# Patient Record
Sex: FEMALE | URBAN - METROPOLITAN AREA
[De-identification: names, ages, dates, MRNs, and addresses within clinical notes are randomized per-mention and may not be internally consistent; named-entity substitution may affect disease eponyms.]

---

## 2023-06-12 ENCOUNTER — AMB VIDEO VISIT (OUTPATIENT)
Dept: OTHER | Facility: HOSPITAL | Age: 48
End: 2023-06-12

## 2023-06-12 DIAGNOSIS — B96.89 ACUTE BACTERIAL SINUSITIS: Primary | ICD-10-CM

## 2023-06-12 DIAGNOSIS — J01.90 ACUTE BACTERIAL SINUSITIS: Primary | ICD-10-CM

## 2023-06-12 PROCEDURE — ECARE PR SL URGENT CARE VIRTUAL VISIT: Performed by: PHYSICIAN ASSISTANT

## 2023-06-12 RX ORDER — AMOXICILLIN AND CLAVULANATE POTASSIUM 875; 125 MG/1; MG/1
1 TABLET, FILM COATED ORAL EVERY 12 HOURS SCHEDULED
Qty: 14 TABLET | Refills: 0 | Status: SHIPPED | OUTPATIENT
Start: 2023-06-12 | End: 2023-06-19

## 2023-06-12 NOTE — PROGRESS NOTES
Video Visit - Bryant Matthew 46 y o  female MRN: [id number]    REQUIRED DOCUMENTATION:     At address in chart    1  [facility]  2  Provider located at [address] [place] [id number] [phone]  3  [facility] provider: Janet Roblero PA-C   4  Identify all parties in room with patient during [facility] visit:  Pt w/ spouse  5  After connecting through [email or web address], patient was identified by name and date of birth  Patient was then informed that this was a Telemedicine visit and that the exam was being conducted confidentially over secure lines  My office door was closed  No one else was in the room  Patient acknowledged consent and understanding of privacy and security of the Telemedicine visit  I informed the patient that I have reviewed their record in Epic and presented the opportunity for them to ask any questions regarding the visit today  The patient agreed to participate  Patient w/ PMH of T2DM managed with diet & herbs presents w/ spouse for c/o flu symptoms x 2 weeks  She denies fever, chills, sweats, chest tightness, dyspnea, SOB, abdominal pain, and diarrhea  She reports taking advil  She reports sinus congestion, throat pain, cough, and fatigue  She reports taking sudafed and robitussin w/ minimal improvement  Pt notes severe headache from the congestion  She notes vomiting twice yesterday and today due to nausea from PND  NKDA  Review of Systems   Constitutional: Positive for fatigue  Negative for chills and fever  HENT: Positive for congestion, postnasal drip, sinus pressure, sinus pain and sore throat  Negative for ear pain  Eyes: Negative for pain and visual disturbance  Respiratory: Positive for cough  Negative for chest tightness and shortness of breath  Cardiovascular: Negative for chest pain and palpitations  Gastrointestinal: Positive for nausea and vomiting  Negative for abdominal pain     Genitourinary: Negative for dysuria and hematuria  Musculoskeletal: Negative for arthralgias and back pain  Skin: Negative for color change and rash  Neurological: Positive for headaches  Negative for seizures and syncope  All other systems reviewed and are negative  Physical Exam  Vitals and nursing note reviewed  Exam conducted with a chaperone present  Constitutional:       General: She is not in acute distress  Appearance: Normal appearance  She is well-developed  She is ill-appearing  She is not diaphoretic  HENT:      Head: Normocephalic and atraumatic  Nose: Congestion present  Mouth/Throat:      Mouth: Mucous membranes are moist       Pharynx: Oropharynx is clear  Posterior oropharyngeal erythema present  No oropharyngeal exudate  Eyes:      General:         Right eye: No discharge  Left eye: No discharge  Conjunctiva/sclera: Conjunctivae normal    Pulmonary:      Effort: Pulmonary effort is normal  No respiratory distress  Breath sounds: Normal breath sounds  Musculoskeletal:      Cervical back: Neck supple  Skin:     General: Skin is dry  Findings: No rash  Comments: Of face and neck   Neurological:      Mental Status: She is alert and oriented to person, place, and time  Psychiatric:         Behavior: Behavior normal          Thought Content: Thought content normal        Diagnoses and all orders for this visit:    Acute bacterial sinusitis  -     amoxicillin-clavulanate (AUGMENTIN) 875-125 mg per tablet; Take 1 tablet by mouth every 12 (twelve) hours for 7 days      Patient Instructions     Take antibiotic as directed with food  Recommend probiotics for side effects  Continue with over-the-counter symptom relief  Monitor for worsening symptoms  If no improvement or worsening in 2-3 days, have an in-person evaluation    Rhinosinusitis   AMBULATORY CARE:   Rhinosinusitis (RS)  is inflammation or infection of your nasal passages and sinuses   RS is most often caused by a virus but may be caused by bacteria  Acute RS lasts up to 12 weeks  Chronic RS lasts more than 12 weeks  Recurrent RS means you have 4 or more infections in 1 year  Common signs and symptoms:   • Fever    • Pain, pressure, redness, or swelling around the forehead, cheeks, or eyes    • Thick yellow or green discharge from your nose    • Loss of smell or taste    • Dry cough that happens mostly at night or when you lie down    • Headache and face pain that is worse when you lean forward    • Tooth pain, or pain when you chew    Seek care immediately if:   • You have trouble breathing, or wheezing that is getting worse  • You have a stiff neck, a fever, or a bad headache  • You cannot open your eye  • Your eyeball bulges out, or you cannot move your eye  • You are more sleepy than usual, or you notice changes in your ability to think, move, or talk  • You have swelling of your forehead or scalp  Call your doctor if:   • Your symptoms are worse or do not improve after 3 to 5 days of treatment  • You have questions or concerns about your condition or care  Treatment  may not be needed  Your symptoms may go away on their own  Your healthcare provider may recommend watchful waiting for up to 10 days before starting antibiotics  Antibiotics will not help if the infection is caused by a virus  Check with your provider before you take any over-the-counter medicines  You may need any of the following:  • Acetaminophen  decreases pain and fever  It is available without a doctor's order  Ask how much to take and how often to take it  Follow directions  Read the labels of all other medicines you are using to see if they also contain acetaminophen, or ask your doctor or pharmacist  Acetaminophen can cause liver damage if not taken correctly  • NSAIDs , such as ibuprofen, help decrease swelling, pain, and fever  This medicine is available with or without a doctor's order   NSAIDs can cause stomach bleeding or kidney problems in certain people  If you take blood thinner medicine, always ask your healthcare provider if NSAIDs are safe for you  Always read the medicine label and follow directions  • Nasal steroid sprays  help decrease inflammation in your nose and sinuses  • Decongestants  help reduce swelling and drain mucus in the nose and sinuses  They may help you breathe easier  Do not use decongestants for more than 3 days  • Antihistamines  help dry mucus in the nose and relieve sneezing  • Antibiotics  help treat or prevent a bacterial infection  Self-care:   • Rinse your sinuses as directed  Use a sinus rinse device to rinse your nasal passages with a saline (salt water) solution or distilled water  Do not  use tap water  A sinus rinse will help thin the mucus in your nose and rinse away pollen and dirt  It will also help lower swelling so you can breathe normally  • Use a humidifier  to increase air moisture in your home  Moist air may make it easier for you to breathe and help decrease your cough  • Sleep with your head raised  Place an extra pillow under your head before you go to sleep to help your sinuses drain  • Drink liquids as directed  Ask your healthcare provider how much liquid to drink each day and which liquids are best for you  Liquids will thin the mucus in your nose and help it drain  Do not have drinks that contain alcohol or caffeine  • Do not smoke, and avoid secondhand smoke  Nicotine and other chemicals in cigarettes and cigars can make your symptoms worse  Ask your healthcare provider for information if you currently smoke and need help to quit  E-cigarettes or smokeless tobacco still contain nicotine  Talk to your healthcare provider before you use these products  Prevent the spread of germs:   • Wash your hands often with soap and water  Wash your hands after you use the bathroom, change a child's diaper, or sneeze   Wash your hands before you prepare or eat food  • Stay away from people who are sick  Some germs spread easily and quickly through contact  Follow up with your doctor as directed: You may be referred to an ear, nose, and throat specialist  Write down your questions so you remember to ask them during your visits  © [employer] 2022 Information is for End User's use only and may not be sold, redistributed or otherwise used for commercial purposes  The above information is an  only  It is not intended as medical advice for individual conditions or treatments  Talk to your doctor, nurse or pharmacist before following any medical regimen to see if it is safe and effective for you  Follow up with PCP if not improved, if symptoms are worse, go to the ER

## 2023-06-12 NOTE — CARE ANYWHERE EVISITS
Visit Summary for DEBBIE   JOSECLYDE - Gender: Female - Date of Birth: [id number]  Date: [id number] - Duration: 12 minutes  Patient: Lucita Gray   MILAGROS  Provider: Polly Michael PA-C    Patient Contact Information  Address  [address]  [phone]    Visit Topics  Flu-Like Symptoms [Added By: Self - 2023-06-12]  Cold [Added By: Self - 2023-06-12]  Headache [Added By: Self - 2023-06-12]  Fever [Added By: Self - 2023-06-12]  Stomachache [Added By: Self - 2023-06-12]    Triage Questions   What is your current physical address in the event of a medical emergency? Answer []  Are you allergic to any medications? Answer []  Are you now or could you be pregnant? Answer []  Do you have any immune system compromise or chronic lung   disease? Answer []  Do you have any vulnerable family members in the home (infant, pregnant, cancer, elderly)? Answer []     Conversation Transcripts  [0A][0A] [Notification] You are connected with Polly Michael PA-C, Urgent Care Specialist [0A][Notification] Munir Houston is located in [place]  [0A][Notification] Munir Houston has shared health history  Marisol Escobar  [0A]    Diagnosis  Acute sinusitis, unspecified    Procedures  Value: [id number] Code: CPT-4 UNLISTED E&M SERVICE    Medications Prescribed    No prescriptions ordered    Electronically signed by: Yaritza Adorno(NPI [id number])

## 2023-06-12 NOTE — PATIENT INSTRUCTIONS
Take antibiotic as directed with food  Recommend probiotics for side effects  Continue with over-the-counter symptom relief  Monitor for worsening symptoms  If no improvement or worsening in 2-3 days, have an in-person evaluation    Rhinosinusitis   AMBULATORY CARE:   Rhinosinusitis (RS)  is inflammation or infection of your nasal passages and sinuses  RS is most often caused by a virus but may be caused by bacteria  Acute RS lasts up to 12 weeks  Chronic RS lasts more than 12 weeks  Recurrent RS means you have 4 or more infections in 1 year  Common signs and symptoms:   Fever    Pain, pressure, redness, or swelling around the forehead, cheeks, or eyes    Thick yellow or green discharge from your nose    Loss of smell or taste    Dry cough that happens mostly at night or when you lie down    Headache and face pain that is worse when you lean forward    Tooth pain, or pain when you chew    Seek care immediately if:   You have trouble breathing, or wheezing that is getting worse  You have a stiff neck, a fever, or a bad headache  You cannot open your eye  Your eyeball bulges out, or you cannot move your eye  You are more sleepy than usual, or you notice changes in your ability to think, move, or talk  You have swelling of your forehead or scalp  Call your doctor if:   Your symptoms are worse or do not improve after 3 to 5 days of treatment  You have questions or concerns about your condition or care  Treatment  may not be needed  Your symptoms may go away on their own  Your healthcare provider may recommend watchful waiting for up to 10 days before starting antibiotics  Antibiotics will not help if the infection is caused by a virus  Check with your provider before you take any over-the-counter medicines  You may need any of the following:  Acetaminophen  decreases pain and fever  It is available without a doctor's order  Ask how much to take and how often to take it  Follow directions  Read the labels of all other medicines you are using to see if they also contain acetaminophen, or ask your doctor or pharmacist  Acetaminophen can cause liver damage if not taken correctly  NSAIDs , such as ibuprofen, help decrease swelling, pain, and fever  This medicine is available with or without a doctor's order  NSAIDs can cause stomach bleeding or kidney problems in certain people  If you take blood thinner medicine, always ask your healthcare provider if NSAIDs are safe for you  Always read the medicine label and follow directions  Nasal steroid sprays  help decrease inflammation in your nose and sinuses  Decongestants  help reduce swelling and drain mucus in the nose and sinuses  They may help you breathe easier  Do not use decongestants for more than 3 days  Antihistamines  help dry mucus in the nose and relieve sneezing  Antibiotics  help treat or prevent a bacterial infection  Self-care:   Rinse your sinuses as directed  Use a sinus rinse device to rinse your nasal passages with a saline (salt water) solution or distilled water  Do not  use tap water  A sinus rinse will help thin the mucus in your nose and rinse away pollen and dirt  It will also help lower swelling so you can breathe normally  Use a humidifier  to increase air moisture in your home  Moist air may make it easier for you to breathe and help decrease your cough  Sleep with your head raised  Place an extra pillow under your head before you go to sleep to help your sinuses drain  Drink liquids as directed  Ask your healthcare provider how much liquid to drink each day and which liquids are best for you  Liquids will thin the mucus in your nose and help it drain  Do not have drinks that contain alcohol or caffeine  Do not smoke, and avoid secondhand smoke  Nicotine and other chemicals in cigarettes and cigars can make your symptoms worse   Ask your healthcare provider for information if you currently smoke and need help to quit  E-cigarettes or smokeless tobacco still contain nicotine  Talk to your healthcare provider before you use these products  Prevent the spread of germs:   Wash your hands often with soap and water  Wash your hands after you use the bathroom, change a child's diaper, or sneeze  Wash your hands before you prepare or eat food  Stay away from people who are sick  Some germs spread easily and quickly through contact  Follow up with your doctor as directed: You may be referred to an ear, nose, and throat specialist  Write down your questions so you remember to ask them during your visits  © Copyright [facility] 2022 Information is for End User's use only and may not be sold, redistributed or otherwise used for commercial purposes  The above information is an  only  It is not intended as medical advice for individual conditions or treatments  Talk to your doctor, nurse or pharmacist before following any medical regimen to see if it is safe and effective for you

## 2023-06-12 NOTE — LETTER
June 12, 2023     Hemant Molina    Patient: Hemant Molina   YOB: 1975   Date of Visit: 6/12/2023       To Whom This May Concern:    Hemant Molina was evaluated via Telemedicine on 6/12/2023  Please excuse her from work through 6/13/2023      Sincerely,        Yolanda Rock PA-C        CC: No Recipients

## 2024-06-27 ENCOUNTER — OFFICE VISIT (OUTPATIENT)
Age: 49
End: 2024-06-27
Payer: COMMERCIAL

## 2024-06-27 VITALS
SYSTOLIC BLOOD PRESSURE: 123 MMHG | HEART RATE: 85 BPM | BODY MASS INDEX: 21.35 KG/M2 | DIASTOLIC BLOOD PRESSURE: 86 MMHG | HEIGHT: 67 IN | WEIGHT: 136 LBS

## 2024-06-27 DIAGNOSIS — M99.01 SEGMENTAL DYSFUNCTION OF CERVICAL REGION: Primary | ICD-10-CM

## 2024-06-27 DIAGNOSIS — M54.59 MECHANICAL LOW BACK PAIN: ICD-10-CM

## 2024-06-27 DIAGNOSIS — M99.04 SEGMENTAL DYSFUNCTION OF SACRAL REGION: ICD-10-CM

## 2024-06-27 DIAGNOSIS — M99.03 SEGMENTAL DYSFUNCTION OF LUMBAR REGION: ICD-10-CM

## 2024-06-27 DIAGNOSIS — M54.2 NECK PAIN: ICD-10-CM

## 2024-06-27 DIAGNOSIS — M99.02 SEGMENTAL DYSFUNCTION OF THORACIC REGION: ICD-10-CM

## 2024-06-27 PROCEDURE — 99203 OFFICE O/P NEW LOW 30 MIN: CPT | Performed by: CHIROPRACTOR

## 2024-06-27 PROCEDURE — 98941 CHIROPRACT MANJ 3-4 REGIONS: CPT | Performed by: CHIROPRACTOR

## 2024-06-27 NOTE — PROGRESS NOTES
Diagnoses and all orders for this visit:    Segmental dysfunction of cervical region    Neck pain    Segmental dysfunction of thoracic region    Segmental dysfunction of lumbar region    Segmental dysfunction of sacral region    Mechanical low back pain      ASSESSMENT:  No red flags, radiculopathy or neurologic deficit appreciated clinically. Pt's symptoms and exam findings consistent with mechanical neck/ubp secondary to repetitive st/sp injury, exacerbated by postural/ergonomic stressors. Pt responded well to stretches and manual mobilization of the affected spinal and myofascial tissues with increased ROM; trial of conservative tx recommended consisting of stretching, ther-ex, graded mobilization/manipulation of the affected spinal/myofascial tissues, postural/ergonomic education and take home stretches/exercises. If symptoms fail to improve with short trial of conservative care, appropriate imaging and referral will be coordinated.  Spent greater than 30 min c pt discussing hx, pe, ddx, tx options and reviewing notes/imaging    PROCEDURE CODES: 62501, 18251    TREATMENT:  Fear avoidance behavior discussion, encouraged and reassured pt that natural course of condition is to improve over time with adherence to tx plan and home care strategies. Home care recommendations: avoid bed rest, walk (but avoid trails and uneven surfaces), gradual return to activity to tolerance (avoid anything that peripheralizes symptoms), ice 20 min on/off, watch for ice burn, call if symptoms peripheralize, worsen, or neurologic deficit progresses. Ther-ex: IASTM - discussed post procedure soreness and/or ecchymosis for up to 36 hrs, applied to affected mm hypertonicities; wall kenzie, axial retraction, upper trap stretch, lev scap stretch, SCM stretch, lat rows with t-band, lat pull down with t-band, abdominal bracing; greater than 15 min spent performing above mentioned ther-ex. Thoracic mobilization/manipulation: prone P-A mob,   cervical mobilization/manipulation: diversified supine graded mobilization, cervical traction, prone C/T jct HVLA    HPI:  Radha Dupree is a 49 y.o. female   Chief Complaint   Patient presents with   • Neck - Pain     Neck pain that radiates down both shoulders. Patient also is having jaw and ear pain.    • Back Pain     Mid back pain on left side intermittent sharp pain.   Lower lumbar pain that radiates down left side and left foot .        The patient is feeling more pain in the neck pain, she mentions that she fell down the concrete stairs 15 years ago. The patient also reports increase stress. More stress in the afternoon. The patient works at a [employer]. 4-5 hours a day. 12-14 hours per week. Cooks and doing dishes. Massage, lying down. Just started yoga again about a week ago. She likes to sit on couch with laptop and knows her posture is not always the best. The patient reports their diet is good with fresh food.    Back Pain  Pertinent negatives include no chest pain, fever, headaches, numbness or weakness.     History reviewed. No pertinent past medical history.   The following portions of the patient's history were reviewed and updated as appropriate: allergies, past family history, past medical history, past social history, past surgical history, and problem list.  Review of Systems   Constitutional:  Negative for activity change, fatigue, fever and unexpected weight change.   HENT:  Negative for ear pain, hearing loss, sinus pressure, sinus pain, sore throat and tinnitus.    Respiratory:  Negative for chest tightness, shortness of breath, wheezing and stridor.    Cardiovascular:  Negative for chest pain.   Genitourinary:  Negative for flank pain and frequency.   Musculoskeletal:  Positive for back pain and myalgias. Negative for joint swelling, neck pain and neck stiffness.   Skin:  Negative for color change and pallor.   Neurological:  Negative for dizziness, speech difficulty, weakness, numbness and  headaches.   Psychiatric/Behavioral:  Negative for agitation and sleep disturbance. The patient is not nervous/anxious.      Physical Exam  Constitutional:       General: She is not in acute distress.     Appearance: Normal appearance.   HENT:      Head: Normocephalic.      Mouth/Throat:      Mouth: Mucous membranes are moist.   Eyes:      Extraocular Movements: Extraocular movements intact.      Conjunctiva/sclera: Conjunctivae normal.      Pupils: Pupils are equal, round, and reactive to light.   Neck:      Vascular: No carotid bruit.   Pulmonary:      Effort: Pulmonary effort is normal.   Chest:      Chest wall: No tenderness.   Abdominal:      General: Abdomen is flat.      Palpations: Abdomen is soft.   Musculoskeletal:         General: Tenderness present. No swelling, deformity or signs of injury. Normal range of motion.        Arms:       Cervical back: Normal range of motion. Rigidity and tenderness present.      Right lower leg: No edema.      Left lower leg: No edema.        Legs:    Lymphadenopathy:      Cervical: No cervical adenopathy.   Skin:     General: Skin is warm.      Coloration: Skin is not jaundiced or pale.      Findings: No bruising or erythema.   Neurological:      Mental Status: She is alert and oriented to person, place, and time.      Cranial Nerves: No cranial nerve deficit.      Sensory: No sensory deficit.      Motor: No weakness.      Gait: Gait is intact.      Deep Tendon Reflexes: Reflexes are normal and symmetric.   Psychiatric:         Attention and Perception: Attention normal.         Mood and Affect: Mood and affect normal.         Speech: Speech normal.         Behavior: Behavior normal. Behavior is cooperative.         Thought Content: Thought content normal.         Cognition and Memory: Cognition normal.         Judgment: Judgment normal.       SOFT TISSUE ASSESSMENT: Hypertonicity and tenderness palpated C5-T6 erector spinae, upper traps, lev scap, SCM, scalene, rhomboid,  suboccipitals JOINT RECTRICTIONS: C5-T6, L3-S1, L SIJ  ORTHO: Diane unremarkable for centralization/peripheralization; max foraminal comp elicits local np R/L; shoulder depression elicits stiffness in R/L upper trap; brachial plexus tension test elicits neural tension in R/L UE; cervical distraction elicits relieves CC    Return in about 1 week (around 7/4/2024) for Recheck.

## 2024-09-17 ENCOUNTER — OFFICE VISIT (OUTPATIENT)
Dept: INTERNAL MEDICINE CLINIC | Age: 49
End: 2024-09-17
Payer: COMMERCIAL

## 2024-09-17 VITALS
OXYGEN SATURATION: 99 % | HEART RATE: 88 BPM | BODY MASS INDEX: 21.21 KG/M2 | WEIGHT: 132 LBS | DIASTOLIC BLOOD PRESSURE: 76 MMHG | SYSTOLIC BLOOD PRESSURE: 116 MMHG | HEIGHT: 66 IN | TEMPERATURE: 98.5 F

## 2024-09-17 DIAGNOSIS — Z78.9 VEGETARIAN: ICD-10-CM

## 2024-09-17 DIAGNOSIS — Z12.11 SCREENING FOR MALIGNANT NEOPLASM OF COLON: ICD-10-CM

## 2024-09-17 DIAGNOSIS — G89.29 CHRONIC LEFT-SIDED LOW BACK PAIN WITH LEFT-SIDED SCIATICA: Primary | ICD-10-CM

## 2024-09-17 DIAGNOSIS — Z90.5 SOLITARY KIDNEY, ACQUIRED: ICD-10-CM

## 2024-09-17 DIAGNOSIS — M70.62 TROCHANTERIC BURSITIS OF LEFT HIP: ICD-10-CM

## 2024-09-17 DIAGNOSIS — F43.21 GRIEF: ICD-10-CM

## 2024-09-17 DIAGNOSIS — Z13.1 SCREENING FOR DIABETES MELLITUS: ICD-10-CM

## 2024-09-17 DIAGNOSIS — Z11.59 ENCOUNTER FOR HEPATITIS C SCREENING TEST FOR LOW RISK PATIENT: ICD-10-CM

## 2024-09-17 DIAGNOSIS — Z13.220 SCREENING CHOLESTEROL LEVEL: ICD-10-CM

## 2024-09-17 DIAGNOSIS — M54.42 CHRONIC LEFT-SIDED LOW BACK PAIN WITH LEFT-SIDED SCIATICA: Primary | ICD-10-CM

## 2024-09-17 PROCEDURE — 99203 OFFICE O/P NEW LOW 30 MIN: CPT | Performed by: FAMILY MEDICINE

## 2024-09-17 NOTE — PROGRESS NOTES
Assessment/Plan:    1. Chronic left-sided low back pain with left-sided sciatica  -     XR spine lumbar minimum 4 views non injury; Future; Expected date: 09/17/2024  -     XR hip/pelv 2-3 vws left if performed; Future; Expected date: 09/17/2024  -     Comprehensive metabolic panel; Future  -     Ambulatory Referral to Physical Therapy; Future  2. Screening for malignant neoplasm of colon  -     Cologuard  3. Solitary kidney, acquired  4. Vegetarian  5. Screening for diabetes mellitus  -     Hemoglobin A1C; Future  6. Screening cholesterol level  -     Lipid Panel with Direct LDL reflex; Future  7. BMI 21.0-21.9, adult  -     CBC and differential; Future  -     TSH, 3rd generation with Free T4 reflex; Future  8. Grief  9. Trochanteric bursitis of left hip  -     Ambulatory Referral to Physical Therapy; Future  10. Encounter for hepatitis C screening test for low risk patient  -     Hepatitis C antibody; Future          There are no Patient Instructions on file for this visit.    Return in about 4 weeks (around 10/15/2024).    Subjective:      Patient ID: Prema Craig is a 49 y.o. female.    Chief Complaint   Patient presents with    [facility]     Establish care- patient is new patient     Pain     Patient states hip pain, all down the left side- -  passed away 4 weeks ago - he was a message therapist working on her    HM     Patient refuses mammogram - Patient will try cologuard       HPI  Patient with several week history of left-sided musculoskeletal complaints, her  was a massage therapist who passed away 4 weeks ago, due to lack of therapy and the stress her symptoms have significantly worsened.  Patient has never had x-rays or formal physical therapy.  She has had acupuncture many years ago during her pregnancy.  Patient states she does not due to heavy lifting and did not need to do lifting while she was taking care of him.  Patient is not using any over-the-counter medications or topical  "solutions to help with the discomfort        The following portions of the patient's history were reviewed and updated as appropriate: allergies, current medications, past family history, past medical history, past social history, past surgical history and problem list.    Review of Systems      Constitutional:  Denies fever or chills   Eyes:  Denies double , blurry vision or eye pain  HENT:  Denies nasal congestion, sore throat or new hearing issues  Respiratory:  Denies cough or shortness of breath or wheezing  Cardiovascular:  Denies palpitations or chest pain  GI:  Denies abdominal pain, nausea, or vomiting, no loose stools, no reflux  Integument:  Denies rash , no open areas  Neurologic:  Denies headache or focal weakness, no dizziness  : no dysuria, or hematuria        No current outpatient medications on file.     No current facility-administered medications for this visit.       Objective:    /76 (BP Location: Left arm, Patient Position: Sitting, Cuff Size: Standard)   Pulse 88   Temp 98.5 °F (36.9 °C) (Temporal)   Ht 5' 6\" (1.676 m) Comment: shoes on  Wt 59.9 kg (132 lb)   SpO2 99%   BMI 21.31 kg/m²        Physical Exam      Constitutional:  Well developed, well nourished, no acute distress, non-toxic appearance   Eyes:  PERRL, conjunctiva normal , non icteric sclera  HENT:  Atraumatic, oropharynx moist. Neck-  supple   Respiratory:  CTA b/l, normal breath sounds, no rales, no wheezing   Cardiovascular:  RRR, no murmurs, no LE edema b/l  GI:  Soft, nondistended, normal bowel sounds x 4, nontender, no organomegaly, no mass, no rebound, no guarding   Neurologic:  no focal deficits noted   Psychiatric:  Speech and behavior appropriate , AAO x 3  MS: Flat lordotic curve, slight kyphosis, left trapezius with hypertonicity.  Tender to palpation left greater trochanter, no spinal pain to palpation, negative straight leg test bilaterally, tender to palpation left hamstring.  No knee pain to " palpation.       Yocasta Cordero DO

## 2024-10-17 PROBLEM — Z12.11 SCREENING FOR MALIGNANT NEOPLASM OF COLON: Status: RESOLVED | Noted: 2024-09-17 | Resolved: 2024-10-17

## 2024-10-17 PROBLEM — Z13.1 SCREENING FOR DIABETES MELLITUS: Status: RESOLVED | Noted: 2024-09-17 | Resolved: 2024-10-17

## 2024-10-23 ENCOUNTER — EVALUATION (OUTPATIENT)
Dept: PHYSICAL THERAPY | Facility: REHABILITATION | Age: 49
End: 2024-10-23
Payer: COMMERCIAL

## 2024-10-23 DIAGNOSIS — M70.62 TROCHANTERIC BURSITIS OF LEFT HIP: ICD-10-CM

## 2024-10-23 DIAGNOSIS — G89.29 CHRONIC LEFT-SIDED LOW BACK PAIN WITH LEFT-SIDED SCIATICA: Primary | ICD-10-CM

## 2024-10-23 DIAGNOSIS — M54.42 CHRONIC LEFT-SIDED LOW BACK PAIN WITH LEFT-SIDED SCIATICA: Primary | ICD-10-CM

## 2024-10-23 PROCEDURE — 97162 PT EVAL MOD COMPLEX 30 MIN: CPT | Performed by: PHYSICAL THERAPIST

## 2024-10-23 NOTE — PROGRESS NOTES
PT Evaluation     Today's date: 10/23/2024  Patient name: Prema Craig  : 1975  MRN: [id number]  Referring provider: Yocasta Cordero DO  Dx:   Encounter Diagnosis     ICD-10-CM    1. Chronic left-sided low back pain with left-sided sciatica  M54.42     G89.29                      Assessment  Impairments: abnormal coordination, abnormal or restricted ROM, activity intolerance, impaired physical strength, lacks appropriate home exercise program, pain with function and poor posture   Symptom irritability: moderate    Assessment details: Pt is a pleasant 49 y.o. female presenting to outpatient physical therapy with Chronic left-sided low back pain with left-sided sciatica  (primary encounter diagnosis)  Trochanteric bursitis of left hip. Pt presents with pain, decreased range of motion, decreased strength, and decreased tolerance to activity. Pt is a good candidate for outpatient physical therapy and would benefit from skilled physical therapy to address limitations and to achieve goals. Thank you for this referral.   Understanding of Dx/Px/POC: good     Prognosis: good    Goals  Short-Term Goals (4 weeks)   1. Patient will decrease worst rating of pain by 25% to improve quality of life.  2. Patient will increase strength by 1/2 MMT to improve quality of life with improved efficiency of daily activities.  3. Patient will improve ROM by 25% indicating improved mobility of affected area.    Long-Term Goals (8 weeks)   1. Patient will decrease pain by 50% at worst in comparison to IE indicating significant reduction in pain and improved quality of life.  2. Patient will demonstrate strength WFL compared to IE levels indicating ability to independently manage pain symptoms to accomplish daily activities.   3. Patient will be independent with HEP with good form accomplished.      Plan  Patient would benefit from: PT eval and skilled PT  Planned modality interventions: cryotherapy and thermotherapy: hydrocollator  packs    Planned therapy interventions: IADL retraining, body mechanics training, flexibility, functional ROM exercises, home exercise program, neuromuscular re-education, manual therapy, postural training, strengthening, stretching, therapeutic activities, therapeutic exercise, joint mobilization and IASTM    Frequency: 2x week  Duration in weeks: 8  Treatment plan discussed with: patient        Subjective Evaluation    History of Present Illness  Mechanism of injury: Pt is a 49 year-old right-handed female presenting to PT with 3 month history of low back pain. Pt denies any trauma, injury or preceding event. Pt went to PCP for further evaluation, x-rays ordered, however, pt has not yet gone for them. Pt not prescribed any medication for pain. Pt referred to OPPT.    Pt reports her  is a massage therapist and she has been receiving massages, last was 8/15/24.    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    Pain Location: left lateral hip    Pain Type: sharp    Pain Intensity:  Current: 0  Best: 0  Worst: 6    Pt reports increased pain and/or difficulty with: standing still, sitting still, sitting > 4 hours. Pt denies sleep disturbance secondary to pain.     Pt reports decreased pain with: mobility, massage            Recurrent probem    Patient Goals  Patient goals for therapy: decreased pain, increased motion and increased strength      Diagnostic Tests  No diagnostic tests performed        Objective     Concurrent Complaints  Negative for night pain, disturbed sleep, bladder dysfunction, bowel dysfunction and saddle (S4) numbness    Postural Observations    Additional Postural Observation Details  Moderate forward head, bilateral rounded shoulders, increased lumbar lordosis.    Neurological Testing     Sensation     Lumbar   Left   Intact: light touch    Right   Intact: light touch    Active Range of Motion     Lumbar   Flexion: 35 degrees  with pain  Extension: 5  degrees  with pain  Left lateral flexion: 10 degrees    with pain  Right lateral flexion: 10 degrees  with pain  Left rotation: 10 degrees  with pain  Right rotation: 10 degrees     Strength/Myotome Testing     Left Hip   Planes of Motion   Flexion: 4-  Extension: 3+  Abduction: 3+  Adduction: 3+  External rotation: 4+  Internal rotation: 4+    Right Hip   Planes of Motion   Flexion: 4-  Extension: 3+  Abduction: 4  Adduction: 4+  External rotation: 4+  Internal rotation: 4+    Left Knee   Flexion: 4-  Extension: 4-    Right Knee   Flexion: 5  Extension: 5    Left Ankle/Foot   Dorsiflexion: 5    Right Ankle/Foot   Dorsiflexion: 5    Tests     Lumbar     Left   Negative crossed SLR, passive SLR and slump test.     Right   Negative crossed SLR, passive SLR and slump test.     Left Pelvic Girdle/Sacrum   Negative: active SLR test.     Right Pelvic Girdle/Sacrum   Negative: active SLR test.              Insurance:     Precautions:      Daily Treatment Diary      Visit # 1 2 3 4 5 6 7 8 9 10   Assessment  10/23                     Alan/Yohana JOSUE                 **   FOTO  NV       **         **   Manuals                                                     Prescribed POC    Pt Education  MD                      HEP Issued/Updated  MD                                                                                                                                                                                                                      Goal Oriented Functional Activity:                                                Modalities                                        QR Code:    [facility] HEP:

## 2024-11-20 ENCOUNTER — OFFICE VISIT (OUTPATIENT)
Dept: INTERNAL MEDICINE CLINIC | Facility: OTHER | Age: 49
End: 2024-11-20
Payer: COMMERCIAL

## 2024-11-20 VITALS
HEART RATE: 110 BPM | TEMPERATURE: 98.1 F | BODY MASS INDEX: 21.57 KG/M2 | OXYGEN SATURATION: 97 % | WEIGHT: 134.2 LBS | HEIGHT: 66 IN | DIASTOLIC BLOOD PRESSURE: 70 MMHG | SYSTOLIC BLOOD PRESSURE: 124 MMHG | RESPIRATION RATE: 20 BRPM

## 2024-11-20 DIAGNOSIS — M54.42 CHRONIC LEFT-SIDED LOW BACK PAIN WITH LEFT-SIDED SCIATICA: Primary | ICD-10-CM

## 2024-11-20 DIAGNOSIS — G89.29 CHRONIC LEFT-SIDED LOW BACK PAIN WITH LEFT-SIDED SCIATICA: Primary | ICD-10-CM

## 2024-11-20 DIAGNOSIS — E53.8 VITAMIN B 12 DEFICIENCY: ICD-10-CM

## 2024-11-20 DIAGNOSIS — Z28.21 INFLUENZA VACCINE REFUSED: ICD-10-CM

## 2024-11-20 DIAGNOSIS — Z12.31 ENCOUNTER FOR SCREENING MAMMOGRAM FOR BREAST CANCER: ICD-10-CM

## 2024-11-20 PROCEDURE — 99213 OFFICE O/P EST LOW 20 MIN: CPT | Performed by: FAMILY MEDICINE

## 2024-11-20 NOTE — PROGRESS NOTES
Assessment/Plan:    1. Chronic left-sided low back pain with left-sided sciatica  2. Encounter for screening mammogram for breast cancer  3. Vitamin B 12 deficiency  -     Vitamin B12; Future; Expected date: 11/20/2024  4. Influenza vaccine refused          There are no Patient Instructions on file for this visit.    Return if symptoms worsen or fail to improve.    Subjective:      Patient ID: Prema Craig is a 49 y.o. female.    Chief Complaint   Patient presents with    Follow-up     2 month - labs not done -          Annual, pap, mammogram ( REFUSED ), Cologuard ( was done yesterday  scheduled for today )        HPI  Patient with several week history of left-sided musculoskeletal complaints, her  was a massage therapist who passed away 4 weeks ago, due to lack of therapy and the stress her symptoms have significantly worsened.  Patient has never had x-rays or formal physical therapy.  She has had acupuncture many years ago during her pregnancy.  Patient states she does not due to heavy lifting and did not need to do lifting while she was taking care of him.  Patient is not using any over-the-counter medications or topical solutions to help with the discomfort   nov 2024: pain is 80% better , she has been taking OTC vitamins and stretching, she did not get labs or xrays done, she did not have a chance to get appt with acupuncture. She did et a massage and that helped her. She will be traveling to [facility] for one month  and may need a letter to travel if she needs help with l[place]uge      The following portions of the patient's history were reviewed and updated as appropriate: allergies, current medications, past family history, past medical history, past social history, past surgical history and problem list.    Review of Systems        Constitutional:  Denies fever or chills   Eyes:  Denies double , blurry vision or eye pain  HENT:  Denies nasal congestion, sore throat or new hearing  "issues  Respiratory:  Denies cough or shortness of breath or wheezing  Cardiovascular:  Denies palpitations or chest pain  GI:  Denies abdominal pain, nausea, or vomiting, no loose stools, no reflux  Integument:  Denies rash , no open areas  Neurologic:  Denies headache or focal weakness, no dizziness  : no dysuria, or hematuria    No current outpatient medications on file.     No current facility-administered medications for this visit.       Objective:    /70 (BP Location: Left arm, Patient Position: Sitting, Cuff Size: Standard)   Pulse (!) 110   Temp 98.1 °F (36.7 °C) (Temporal)   Resp 20   Ht 5' 6\" (1.676 m)   Wt 60.9 kg (134 lb 3.2 oz)   SpO2 97%   BMI 21.66 kg/m²        Physical Exam      Constitutional:  Well developed, well nourished, no acute distress, non-toxic appearance   Eyes:  PERRL, conjunctiva normal , non icteric sclera  HENT:  Atraumatic, oropharynx moist. Neck-  supple   Respiratory:  CTA b/l, normal breath sounds, no rales, no wheezing   Cardiovascular:  RRR, no murmurs, no LE edema b/l  GI:  Soft, nondistended, normal bowel sounds x 4, nontender, no organomegaly, no mass, no rebound, no guarding   Neurologic:  no focal deficits noted   Psychiatric:  Speech and behavior appropriate , AAO x 3      Yocasta Cordero DO  "

## 2024-11-28 LAB — COLOGUARD RESULT REPORTABLE: NEGATIVE

## 2025-01-13 ENCOUNTER — TELEPHONE (OUTPATIENT)
Dept: INTERNAL MEDICINE CLINIC | Facility: CLINIC | Age: 50
End: 2025-01-13

## 2025-01-13 NOTE — TELEPHONE ENCOUNTER
Patient called back, patient had to disconnect call before scheduling patient for follow up he asked to be seen in NH only between 10:30-2:30.  Called patient back but had to leave voicemail.  Told patient to call back to confirm either 1/24 or 5/15 for next appointment.    Thank you

## 2025-01-13 NOTE — TELEPHONE ENCOUNTER
Left message on machine for patient to call back and schedule follow-up appointment in Feb.     Overdue labs also.     My chart sent

## 2025-01-24 ENCOUNTER — APPOINTMENT (OUTPATIENT)
Dept: LAB | Facility: IMAGING CENTER | Age: 50
End: 2025-01-24
Payer: COMMERCIAL

## 2025-01-24 ENCOUNTER — OFFICE VISIT (OUTPATIENT)
Dept: INTERNAL MEDICINE CLINIC | Facility: OTHER | Age: 50
End: 2025-01-24
Payer: COMMERCIAL

## 2025-01-24 ENCOUNTER — HOSPITAL ENCOUNTER (OUTPATIENT)
Dept: RADIOLOGY | Facility: IMAGING CENTER | Age: 50
End: 2025-01-24
Payer: COMMERCIAL

## 2025-01-24 VITALS
BODY MASS INDEX: 22.05 KG/M2 | RESPIRATION RATE: 20 BRPM | SYSTOLIC BLOOD PRESSURE: 124 MMHG | DIASTOLIC BLOOD PRESSURE: 74 MMHG | HEART RATE: 110 BPM | OXYGEN SATURATION: 98 % | WEIGHT: 137.2 LBS | HEIGHT: 66 IN | TEMPERATURE: 98 F

## 2025-01-24 DIAGNOSIS — M54.2 NECK PAIN: Primary | ICD-10-CM

## 2025-01-24 DIAGNOSIS — M54.2 NECK PAIN: ICD-10-CM

## 2025-01-24 DIAGNOSIS — Z12.31 ENCOUNTER FOR SCREENING MAMMOGRAM FOR BREAST CANCER: ICD-10-CM

## 2025-01-24 PROCEDURE — 99213 OFFICE O/P EST LOW 20 MIN: CPT | Performed by: FAMILY MEDICINE

## 2025-01-24 PROCEDURE — 72050 X-RAY EXAM NECK SPINE 4/5VWS: CPT

## 2025-01-24 NOTE — PROGRESS NOTES
Assessment/Plan:    1. Neck pain  -     XR spine cervical complete 4 or 5 vw non injury; Future; Expected date: 01/24/2025  2. Encounter for screening mammogram for breast cancer          There are no Patient Instructions on file for this visit.    Return for Next scheduled follow up.    Subjective:      Patient ID: Radha Dupree is a 49 y.o. female.    Chief Complaint   Patient presents with    Follow-up     2 month - labs active from Sept         Mammogram ( REFUSED ) pap    Arthritis     Was recently diagnosis and wanted a second opinion     Physical Exam       HPI      Patient states she saw the chiropractor today for neck adjustment and was very upset with his assessment of arthritis.  Patient was previously having discomfort and pain but after an adjustment is not having any.  She would like a second opinion to know if she actually has arthritis or not.  Not requiring any medications no trauma to the neck however about 20 years ago she did fall from a few steps and fell backwards onto concrete.  No loss of consciousness and does not have any other details.  She has had no recent trauma otherwise feeling well no problems with range of motion.      The following portions of the patient's history were reviewed and updated as appropriate: allergies, current medications, past family history, past medical history, past social history, past surgical history and problem list.    Review of Systems      Constitutional:  Denies fever or chills   Eyes:  Denies double , blurry vision or eye pain  HENT:  Denies nasal congestion, sore throat or new hearing issues  Respiratory:  Denies cough or shortness of breath or wheezing  Cardiovascular:  Denies palpitations or chest pain  GI:  Denies abdominal pain, nausea, or vomiting, no loose stools, no reflux  Integument:  Denies rash , no open areas  Neurologic:  Denies headache or focal weakness, no dizziness  : no dysuria, or hematuria        No current outpatient  "medications on file.     No current facility-administered medications for this visit.       Objective:    /74 (BP Location: Left arm, Patient Position: Sitting, Cuff Size: Standard)   Pulse (!) 110   Temp 98 °F (36.7 °C) (Temporal)   Resp 20   Ht 5' 6\" (1.676 m)   Wt 62.2 kg (137 lb 3.2 oz)   SpO2 98%   BMI 22.14 kg/m²        Physical Exam    Constitutional:  Well developed, well nourished, no acute distress, non-toxic appearance   Eyes:  PERRL, conjunctiva normal , non icteric sclera  HENT:  Atraumatic, oropharynx moist. Neck-  supple   Respiratory:  CTA b/l, normal breath sounds, no rales, no wheezing   Cardiovascular:  RRR, no murmurs, no LE edema b/l  GI:  Soft, nondistended, normal bowel sounds x 4, nontender, no organomegaly, no mass, no rebound, no guarding   Neurologic:  no focal deficits noted   Psychiatric:  Speech and behavior appropriate , AAO x 3      Shanti Chery DO  "